# Patient Record
Sex: FEMALE
[De-identification: names, ages, dates, MRNs, and addresses within clinical notes are randomized per-mention and may not be internally consistent; named-entity substitution may affect disease eponyms.]

---

## 2019-09-29 ENCOUNTER — HOSPITAL ENCOUNTER (EMERGENCY)
Dept: HOSPITAL 97 - ER | Age: 34
Discharge: HOME | End: 2019-09-29
Payer: SELF-PAY

## 2019-09-29 VITALS — DIASTOLIC BLOOD PRESSURE: 109 MMHG | OXYGEN SATURATION: 100 % | SYSTOLIC BLOOD PRESSURE: 166 MMHG | TEMPERATURE: 99.4 F

## 2019-09-29 DIAGNOSIS — R50.9: ICD-10-CM

## 2019-09-29 DIAGNOSIS — K02.9: Primary | ICD-10-CM

## 2019-09-29 PROCEDURE — 87086 URINE CULTURE/COLONY COUNT: CPT

## 2019-09-29 PROCEDURE — 99283 EMERGENCY DEPT VISIT LOW MDM: CPT

## 2019-09-29 PROCEDURE — 81025 URINE PREGNANCY TEST: CPT

## 2019-09-29 PROCEDURE — 87088 URINE BACTERIA CULTURE: CPT

## 2019-09-29 PROCEDURE — 81003 URINALYSIS AUTO W/O SCOPE: CPT

## 2019-09-29 NOTE — EDPHYS
Physician Documentation                                                                           

 [facility]                                                                 

Name: Adenike Martini                                                                               

Age: 33 yrs                                                                                       

Sex: Female                                                                                       

: 1985                                                                                   

MRN: [id number]                                                                                   

Arrival Date: 2019                                                                          

Time: 11:27                                                                                       

Account#: [id number]                                                                            

Bed 23                                                                                            

Private MD:                                                                                       

ED Physician Ludwig Lara                                                                      

HPI:                                                                                              

                                                                                             

12:58 This 33 yrs old  Female presents to ER via Ambulatory with complaints of        magan 

      Headache, Fever, dental pain, Anxiety.                                                      

12:58 The patient complains of pain to the left cheek, mouth, left jaw and left mandible. [facility] 

      patient describes the headache as aching. Onset: The symptoms/episode began/occurred 2      

      day(s) ago. Associated signs and symptoms: The patient has no apparent associated signs     

      or symptoms. Severity of symptoms: At its worst the pain was mild, in the emergency         

      department the pain is unchanged. Headache History: The patient has had previous            

      headaches and this one is similar to previous episodes. The patient has not experienced     

      similar symptoms in the past.                                                               

                                                                                                  

Historical:                                                                                       

- Allergies:                                                                                      

11:42 No Known Allergies;                                                                     aa1 

- Home Meds:                                                                                      

11:42 Dexamethasone Oral [Active]; Metronidazole Oral [Active]; Cephalexin Oral [Active];     aa1 

- PSHx:                                                                                           

11:42 None;                                                                                   aa1 

                                                                                                  

- Immunization history:: Adult Immunizations up to date.                                          

- Social history:: Smoking status: Patient/guardian denies using tobacco.                         

- Ebola Screening: : Patient denies exposure to infectious person Patient denies travel           

  to an Ebola-affected area in the 21 days before illness onset.                                  

- Family history:: not pertinent.                                                                 

                                                                                                  

                                                                                                  

ROS:                                                                                              

12:58 Constitutional: Negative for fever, chills, and weight loss, Eyes: Negative for injury, magan 

      pain, redness, and discharge, Neck: Negative for injury, pain, and swelling,                

      Cardiovascular: Negative for chest pain, palpitations, and edema, Respiratory: Negative     

      for shortness of breath, cough, wheezing, and pleuritic chest pain, Abdomen/GI:             

      Negative for abdominal pain, nausea, vomiting, diarrhea, and constipation, Back:            

      Negative for injury and pain, : Negative for injury, bleeding, discharge, and             

      swelling, MS/Extremity: Negative for injury and deformity, Skin: Negative for injury,       

      rash, and discoloration, Neuro: Negative for headache, weakness, numbness, tingling,        

      and seizure, Psych: Negative for depression, anxiety, suicide ideation, homicidal           

      ideation, and hallucinations, Allergy/Immunology: Negative for hives, rash, and             

      allergies, Endocrine: Negative for neck swelling, polydipsia, polyuria, polyphagia, and     

      marked weight changes, Hematologic/Lymphatic: Negative for swollen nodes, abnormal          

      bleeding, and unusual bruising.                                                             

12:58 ENT: Positive for Teeth pain                                                                

                                                                                                  

Exam:                                                                                             

12:58 Constitutional:  This is a well developed, well nourished patient who is awake, alert,  magan 

      and in no acute distress. Eyes:  Pupils equal round and reactive to light, extra-ocular     

      motions intact.  Lids and lashes normal.  Conjunctiva and sclera are non-icteric and        

      not injected.  Cornea within normal limits.  Periorbital areas with no swelling,            

      redness, or edema. ENT:  Nares patent. No nasal discharge, no septal abnormalities          

      noted.  Tympanic membranes are normal and external auditory canals are clear.               

      Oropharynx with no redness, swelling, or masses, exudates, or evidence of obstruction,      

      uvula midline.  Mucous membranes moist. Neck:  Trachea midline, no thyromegaly or           

      masses palpated, and no cervical lymphadenopathy.  Supple, full range of motion without     

      nuchal rigidity, or vertebral point tenderness.  No Meningismus. Chest/axilla:  Normal      

      chest wall appearance and motion.  Nontender with no deformity.  No lesions are             

      appreciated. Cardiovascular:  Regular rate and rhythm with a normal S1 and S2.  No          

      gallops, murmurs, or rubs.  Normal PMI, no JVD.  No pulse deficits. Respiratory:  Lungs     

      have equal breath sounds bilaterally, clear to auscultation and percussion.  No rales,      

      rhonchi or wheezes noted.  No increased work of breathing, no retractions or nasal          

      flaring. Abdomen/GI:  Soft, non-tender, with normal bowel sounds.  No distension or         

      tympany.  No guarding or rebound.  No evidence of tenderness throughout. Back:  No          

      spinal tenderness.  No costovertebral tenderness.  Full range of motion. Skin:  Warm,       

      dry with normal turgor.  Normal color with no rashes, no lesions, and no evidence of        

      cellulitis. MS/ Extremity:  Pulses equal, no cyanosis.  Neurovascular intact.  Full,        

      normal range of motion. Neuro:  Awake and alert, GCS 15, oriented to person, place,         

      time, and situation.  Cranial nerves II-XII grossly intact.  Motor strength 5/5 in all      

      extremities.  Sensory grossly intact.  Cerebellar exam normal.  Normal gait. Psych:         

      Awake, alert, with orientation to person, place and time.  Behavior, mood, and affect       

      are within normal limits.                                                                   

12:58 Head/face: Noted is swelling, that is mild, of the  left jaw and left cheek.                

                                                                                                  

Vital Signs:                                                                                      

11:40  / 109; Pulse 114; Resp 20; Temp 99.4(TE); Pulse Ox 100% on R/A; Weight 40.82 kg; aa1 

      Pain 10/10;                                                                                 

                                                                                                  

MDM:                                                                                              

12:38 Patient medically screened.                                                             [facility] 

13:02 Data reviewed: vital signs, nurses notes, lab test result(s), urinalysis.               [facility] 

                                                                                                  

                                                                                             

13:06 Order name: Urine Dipstick--Ancillary (enter results)                                     

                                                                                             

13:06 Order name: Urine Pregnancy--Ancillary (enter results)                                    

                                                                                             

13:18 Order name: Urine Culture                                                               ss  

                                                                                                  

Administered Medications:                                                                         

13:12 Drug: Augmentin 875 mg Route: PO;                                                       ss  

13:17 Follow up: Response: Medication administered at discharge.                              ss  

                                                                                                  

                                                                                                  

Disposition:                                                                                      

19 13:03 Discharged to Home. Impression: Dental caries, Fever, unspecified.                 

- Condition is Stable.                                                                            

- Discharge Instructions: Dental Pain, Fever, Adult, Urinary Tract Infection, Adult,              

  Urinary Tract Infection, Adult, Easy-to-Read, Dental Pain, Easy-to-Read, Fever,                 

  Adult, Easy-to-Read.                                                                            

- Prescriptions for Augmentin 875- 125 mg Oral Tablet - take 1 tablet by ORAL route               

  every 12 hours for 7 days; 14 tablet.                                                           

- Medication Reconciliation Form, Thank You Letter, Antibiotic Education, Prescription            

  Opioid Use form.                                                                                

- Follow up: Private Physician; When: 2 - 3 days; Reason: Recheck today's complaints,             

  Continuance of care, Re-evaluation by your physician.                                           

- Problem is new.                                                                                 

- Symptoms have improved.                                                                         

                                                                                                  

                                                                                                  

                                                                                                  

Signatures:                                                                                       

Dispatcher [employer]                           Cris Mosqueda RN                  RN   aa1                                                  

Ludwig Lara MD MD cha Smirch, Shelby, RN                      RN   ss                                                   

                                                                                                  

Corrections: (The following items were deleted from the chart)                                    

13:18 13:03 2019 13:03 Discharged to Home. Impression: Dental caries; Fever,            ss  

      unspecified. Condition is Stable. Forms are Medication Reconciliation Form, Thank You       

      Letter, Antibiotic Education, Prescription Opioid Use. Follow up: Private Physician;        

      When: 2 - 3 days; Reason: Recheck today's complaints, Continuance of care,                  

      Re-evaluation by your physician. Problem is new. Symptoms have improved. magan                

                                                                                                  

**************************************************************************************************